# Patient Record
Sex: FEMALE | Race: WHITE | Employment: OTHER | URBAN - METROPOLITAN AREA
[De-identification: names, ages, dates, MRNs, and addresses within clinical notes are randomized per-mention and may not be internally consistent; named-entity substitution may affect disease eponyms.]

---

## 2017-09-30 ENCOUNTER — HOSPITAL ENCOUNTER (EMERGENCY)
Age: 82
Discharge: HOME | End: 2017-09-30
Attending: EMERGENCY MEDICINE | Admitting: EMERGENCY MEDICINE
Payer: MEDICARE

## 2017-09-30 ENCOUNTER — APPOINTMENT (OUTPATIENT)
Dept: GENERAL RADIOLOGY | Age: 82
End: 2017-09-30
Attending: PHYSICIAN ASSISTANT
Payer: MEDICARE

## 2017-09-30 ENCOUNTER — APPOINTMENT (OUTPATIENT)
Dept: CT IMAGING | Age: 82
End: 2017-09-30
Attending: PHYSICIAN ASSISTANT
Payer: MEDICARE

## 2017-09-30 VITALS
SYSTOLIC BLOOD PRESSURE: 172 MMHG | TEMPERATURE: 98 F | RESPIRATION RATE: 22 BRPM | OXYGEN SATURATION: 97 % | HEART RATE: 84 BPM | DIASTOLIC BLOOD PRESSURE: 72 MMHG
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
## 2017-09-30 DIAGNOSIS — N39.0 URINARY TRACT INFECTION WITHOUT HEMATURIA, SITE UNSPECIFIED: ICD-10-CM

## 2017-09-30 DIAGNOSIS — S09.90XA HEAD INJURY, INITIAL ENCOUNTER: ICD-10-CM

## 2017-09-30 DIAGNOSIS — S40.011A CONTUSION OF RIGHT SHOULDER, INITIAL ENCOUNTER: ICD-10-CM

## 2017-09-30 DIAGNOSIS — S32.10XA CLOSED FRACTURE OF SACRUM, UNSPECIFIED PORTION OF SACRUM, INITIAL ENCOUNTER (HCC): ICD-10-CM

## 2017-09-30 LAB
ALBUMIN SERPL-MCNC: 3.3 G/DL (ref 3.5–5)
ALBUMIN/GLOB SERPL: 1 {RATIO} (ref 1.1–2.2)
ALP SERPL-CCNC: 49 U/L (ref 45–117)
ALT SERPL-CCNC: 15 U/L (ref 12–78)
ANION GAP SERPL CALC-SCNC: 9 MMOL/L (ref 5–15)
APPEARANCE UR: ABNORMAL
AST SERPL-CCNC: 23 U/L (ref 15–37)
BACTERIA URNS QL MICRO: ABNORMAL /HPF
BASOPHILS # BLD: 0 K/UL (ref 0–0.1)
BASOPHILS NFR BLD: 0 % (ref 0–1)
BILIRUB SERPL-MCNC: 0.7 MG/DL (ref 0.2–1)
BILIRUB UR QL: NEGATIVE
BUN SERPL-MCNC: 15 MG/DL (ref 6–20)
BUN/CREAT SERPL: 14 (ref 12–20)
CALCIUM SERPL-MCNC: 8.7 MG/DL (ref 8.5–10.1)
CHLORIDE SERPL-SCNC: 100 MMOL/L (ref 97–108)
CO2 SERPL-SCNC: 29 MMOL/L (ref 21–32)
COLOR UR: ABNORMAL
CREAT SERPL-MCNC: 1.04 MG/DL (ref 0.55–1.02)
EOSINOPHIL # BLD: 0.1 K/UL (ref 0–0.4)
EOSINOPHIL NFR BLD: 1 % (ref 0–7)
EPITH CASTS URNS QL MICRO: ABNORMAL /LPF
ERYTHROCYTE [DISTWIDTH] IN BLOOD BY AUTOMATED COUNT: 14.2 % (ref 11.5–14.5)
GLOBULIN SER CALC-MCNC: 3.3 G/DL (ref 2–4)
GLUCOSE SERPL-MCNC: 127 MG/DL (ref 65–100)
GLUCOSE UR STRIP.AUTO-MCNC: NEGATIVE MG/DL
HCT VFR BLD AUTO: 34.9 % (ref 35–47)
HGB BLD-MCNC: 11.7 G/DL (ref 11.5–16)
HGB UR QL STRIP: ABNORMAL
HYALINE CASTS URNS QL MICRO: ABNORMAL /LPF (ref 0–5)
KETONES UR QL STRIP.AUTO: 15 MG/DL
LEUKOCYTE ESTERASE UR QL STRIP.AUTO: ABNORMAL
LYMPHOCYTES # BLD: 1 K/UL (ref 0.8–3.5)
LYMPHOCYTES NFR BLD: 8 % (ref 12–49)
MAGNESIUM SERPL-MCNC: 1.8 MG/DL (ref 1.6–2.4)
MCH RBC QN AUTO: 31.1 PG (ref 26–34)
MCHC RBC AUTO-ENTMCNC: 33.5 G/DL (ref 30–36.5)
MCV RBC AUTO: 92.8 FL (ref 80–99)
MONOCYTES # BLD: 1.1 K/UL (ref 0–1)
MONOCYTES NFR BLD: 8 % (ref 5–13)
NEUTS SEG # BLD: 11.4 K/UL (ref 1.8–8)
NEUTS SEG NFR BLD: 83 % (ref 32–75)
NITRITE UR QL STRIP.AUTO: NEGATIVE
PH UR STRIP: 7.5 [PH] (ref 5–8)
PLATELET # BLD AUTO: 232 K/UL (ref 150–400)
POTASSIUM SERPL-SCNC: 3.7 MMOL/L (ref 3.5–5.1)
PROT SERPL-MCNC: 6.6 G/DL (ref 6.4–8.2)
PROT UR STRIP-MCNC: 30 MG/DL
RBC # BLD AUTO: 3.76 M/UL (ref 3.8–5.2)
RBC #/AREA URNS HPF: ABNORMAL /HPF (ref 0–5)
SODIUM SERPL-SCNC: 138 MMOL/L (ref 136–145)
SP GR UR REFRACTOMETRY: 1.01 (ref 1–1.03)
TROPONIN I SERPL-MCNC: <0.04 NG/ML
UROBILINOGEN UR QL STRIP.AUTO: 1 EU/DL (ref 0.2–1)
WBC # BLD AUTO: 13.7 K/UL (ref 3.6–11)
WBC URNS QL MICRO: >100 /HPF (ref 0–4)

## 2017-09-30 PROCEDURE — 74011250637 HC RX REV CODE- 250/637: Performed by: PHYSICIAN ASSISTANT

## 2017-09-30 PROCEDURE — 81001 URINALYSIS AUTO W/SCOPE: CPT | Performed by: PHYSICIAN ASSISTANT

## 2017-09-30 PROCEDURE — 99285 EMERGENCY DEPT VISIT HI MDM: CPT

## 2017-09-30 PROCEDURE — 70450 CT HEAD/BRAIN W/O DYE: CPT

## 2017-09-30 PROCEDURE — 84484 ASSAY OF TROPONIN QUANT: CPT | Performed by: PHYSICIAN ASSISTANT

## 2017-09-30 PROCEDURE — 94762 N-INVAS EAR/PLS OXIMTRY CONT: CPT

## 2017-09-30 PROCEDURE — 96375 TX/PRO/DX INJ NEW DRUG ADDON: CPT

## 2017-09-30 PROCEDURE — 72192 CT PELVIS W/O DYE: CPT

## 2017-09-30 PROCEDURE — 36415 COLL VENOUS BLD VENIPUNCTURE: CPT | Performed by: PHYSICIAN ASSISTANT

## 2017-09-30 PROCEDURE — 85025 COMPLETE CBC W/AUTO DIFF WBC: CPT | Performed by: PHYSICIAN ASSISTANT

## 2017-09-30 PROCEDURE — 80053 COMPREHEN METABOLIC PANEL: CPT | Performed by: PHYSICIAN ASSISTANT

## 2017-09-30 PROCEDURE — 72125 CT NECK SPINE W/O DYE: CPT

## 2017-09-30 PROCEDURE — 93005 ELECTROCARDIOGRAM TRACING: CPT

## 2017-09-30 PROCEDURE — 73030 X-RAY EXAM OF SHOULDER: CPT

## 2017-09-30 PROCEDURE — 96361 HYDRATE IV INFUSION ADD-ON: CPT

## 2017-09-30 PROCEDURE — 72170 X-RAY EXAM OF PELVIS: CPT

## 2017-09-30 PROCEDURE — 73552 X-RAY EXAM OF FEMUR 2/>: CPT

## 2017-09-30 PROCEDURE — 87086 URINE CULTURE/COLONY COUNT: CPT | Performed by: PHYSICIAN ASSISTANT

## 2017-09-30 PROCEDURE — 74011250636 HC RX REV CODE- 250/636: Performed by: PHYSICIAN ASSISTANT

## 2017-09-30 PROCEDURE — 83735 ASSAY OF MAGNESIUM: CPT | Performed by: PHYSICIAN ASSISTANT

## 2017-09-30 PROCEDURE — 96374 THER/PROPH/DIAG INJ IV PUSH: CPT

## 2017-09-30 RX ORDER — MORPHINE SULFATE 2 MG/ML
2 INJECTION, SOLUTION INTRAMUSCULAR; INTRAVENOUS
Status: DISCONTINUED | OUTPATIENT
Start: 2017-09-30 | End: 2017-09-30 | Stop reason: HOSPADM

## 2017-09-30 RX ORDER — ACETAMINOPHEN 325 MG/1
650 TABLET ORAL
Qty: 20 TAB | Refills: 0 | Status: SHIPPED | OUTPATIENT
Start: 2017-09-30

## 2017-09-30 RX ORDER — ONDANSETRON 2 MG/ML
4 INJECTION INTRAMUSCULAR; INTRAVENOUS
Status: DISCONTINUED | OUTPATIENT
Start: 2017-09-30 | End: 2017-09-30 | Stop reason: HOSPADM

## 2017-09-30 RX ORDER — LEVOFLOXACIN 500 MG/1
500 TABLET, FILM COATED ORAL
Status: COMPLETED | OUTPATIENT
Start: 2017-09-30 | End: 2017-09-30

## 2017-09-30 RX ORDER — ACETAMINOPHEN 325 MG/1
650 TABLET ORAL ONCE
Status: COMPLETED | OUTPATIENT
Start: 2017-09-30 | End: 2017-09-30

## 2017-09-30 RX ORDER — LEVOFLOXACIN 500 MG/1
500 TABLET, FILM COATED ORAL DAILY
Qty: 7 TAB | Refills: 0 | Status: SHIPPED | OUTPATIENT
Start: 2017-09-30

## 2017-09-30 RX ADMIN — LEVOFLOXACIN 500 MG: 500 TABLET, FILM COATED ORAL at 18:24

## 2017-09-30 RX ADMIN — SODIUM CHLORIDE 500 ML: 900 INJECTION, SOLUTION INTRAVENOUS at 13:56

## 2017-09-30 RX ADMIN — ACETAMINOPHEN 650 MG: 325 TABLET ORAL at 18:24

## 2017-09-30 NOTE — DISCHARGE INSTRUCTIONS
Shoulder Pain: Care Instructions  Your Care Instructions    You can hurt your shoulder by using it too much during an activity, such as fishing or baseball. It can also happen as part of the everyday wear and tear of getting older. Shoulder injuries can be slow to heal, but your shoulder should get better with time. Your doctor may recommend a sling to rest your shoulder. If you have injured your shoulder, you may need testing and treatment. Follow-up care is a key part of your treatment and safety. Be sure to make and go to all appointments, and call your doctor if you are having problems. It's also a good idea to know your test results and keep a list of the medicines you take. How can you care for yourself at home? · Take pain medicines exactly as directed. ¨ If the doctor gave you a prescription medicine for pain, take it as prescribed. ¨ If you are not taking a prescription pain medicine, ask your doctor if you can take an over-the-counter medicine. ¨ Do not take two or more pain medicines at the same time unless the doctor told you to. Many pain medicines contain acetaminophen, which is Tylenol. Too much acetaminophen (Tylenol) can be harmful. · If your doctor recommends that you wear a sling, use it as directed. Do not take it off before your doctor tells you to. · Put ice or a cold pack on the sore area for 10 to 20 minutes at a time. Put a thin cloth between the ice and your skin. · If there is no swelling, you can put moist heat, a heating pad, or a warm cloth on your shoulder. Some doctors suggest alternating between hot and cold. · Rest your shoulder for a few days. If your doctor recommends it, you can then begin gentle exercise of the shoulder, but do not lift anything heavy. When should you call for help? Call 911 anytime you think you may need emergency care. For example, call if:  · You have chest pain or pressure. This may occur with:  ¨ Sweating. ¨ Shortness of breath.   ¨ Nausea or vomiting. ¨ Pain that spreads from the chest to the neck, jaw, or one or both shoulders or arms. ¨ Dizziness or lightheadedness. ¨ A fast or uneven pulse. After calling 911, chew 1 adult-strength aspirin. Wait for an ambulance. Do not try to drive yourself. · Your arm or hand is cool or pale or changes color. Call your doctor now or seek immediate medical care if:  · You have signs of infection, such as:  ¨ Increased pain, swelling, warmth, or redness in your shoulder. ¨ Red streaks leading from a place on your shoulder. ¨ Pus draining from an area of your shoulder. ¨ Swollen lymph nodes in your neck, armpits, or groin. ¨ A fever. Watch closely for changes in your health, and be sure to contact your doctor if:  · You cannot use your shoulder. · Your shoulder does not get better as expected. Where can you learn more? Go to http://sonyActianceyarely.info/. Enter W719 in the search box to learn more about \"Shoulder Pain: Care Instructions. \"  Current as of: March 21, 2017  Content Version: 11.3  © 4298-0013 [employer]. Care instructions adapted under license by [employer] (which disclaims liability or warranty for this information). If you have questions about a medical condition or this instruction, always ask your healthcare professional. [address] any warranty or liability for your use of this information. Learning About a Closed Head Injury  What is a closed head injury? A closed head injury happens when your head gets hit hard. The strong force of the blow causes your brain to shake in your skull. This movement can cause the brain to bruise, swell, or tear. Sometimes nerves or blood vessels also get damaged. This can cause bleeding in or around the brain. A concussion is a type of closed head injury. What are the symptoms? If you have a mild concussion, you may have a mild headache or feel \"not quite right. \" These symptoms are common. They usually go away over a few days to 4 weeks. But sometimes after a concussion, you feel like you can't function as well as before the injury. And you have new symptoms. This is called postconcussive syndrome. You may:  · Find it harder to solve problems, think, concentrate, or remember. · Have headaches. · Have changes in your sleep patterns, such as not being able to sleep or sleeping all the time. · Have changes in your personality. · Not be interested in your usual activities. · Feel angry or anxious without a clear reason. · Lose your sense of taste or smell. · Be dizzy, lightheaded, or unsteady. It may be hard to stand or walk. How is a closed head injury treated? Any person who may have a concussion needs to see a doctor. Some people have to stay in the hospital to be watched. Others can go home safely. If you go home, follow your doctor's instructions. He or she will tell you if you need someone to watch you closely for the next 24 hours or longer. Rest is the best treatment. Get plenty of sleep at night. And try to rest during the day. · Avoid activities that are physically or mentally demanding. These include housework, exercise, and schoolwork. And don't play video games, send text messages, or use the computer. You may need to change your school or work schedule to be able to avoid these activities. · Ask your doctor when it's okay to drive, ride a bike, or operate machinery. · Take an over-the-counter pain medicine, such as acetaminophen (Tylenol), ibuprofen (Advil, Motrin), or naproxen (Aleve). Be safe with medicines. Read and follow all instructions on the label. · Check with your doctor before you use any other medicines for pain. · Do not drink alcohol or use illegal drugs. They can slow recovery. They can also increase your risk of getting a second head injury. Follow-up care is a key part of your treatment and safety.  Be sure to make and go to all appointments, and call your doctor if you are having problems. It's also a good idea to know your test results and keep a list of the medicines you take. Where can you learn more? Go to http://sony-yarely.info/. Enter E235 in the search box to learn more about \"Learning About a Closed Head Injury. \"  Current as of: October 14, 2016  Content Version: 11.3  © [id number] [employer]. Care instructions adapted under license by [employer] (which disclaims liability or warranty for this information). If you have questions about a medical condition or this instruction, always ask your healthcare professional. [place] [address] any warranty or liability for your use of this information. Preventing Falls: Care Instructions  Your Care Instructions  Getting around your home safely can be a challenge if you have injuries or health problems that make it easy for you to fall. Loose rugs and furniture in walkways are among the dangers for many older people who have problems walking or who have poor eyesight. People who have conditions such as arthritis, osteoporosis, or dementia also have to be careful not to fall. You can make your home safer with a few simple measures. Follow-up care is a key part of your treatment and safety. Be sure to make and go to all appointments, and call your doctor if you are having problems. It's also a good idea to know your test results and keep a list of the medicines you take. How can you care for yourself at home? Taking care of yourself  · You may get dizzy if you do not drink enough water. To prevent dehydration, drink plenty of fluids, enough so that your urine is light yellow or clear like water. Choose water and other caffeine-free clear liquids. If you have kidney, heart, or liver disease and have to limit fluids, talk with your doctor before you increase the amount of fluids you drink.   · Exercise regularly to improve your strength, muscle tone, and balance. Walk if you can. Swimming may be a good choice if you cannot walk easily. · Have your vision and hearing checked each year or any time you notice a change. If you have trouble seeing and hearing, you might not be able to avoid objects and could lose your balance. · Know the side effects of the medicines you take. Ask your doctor or pharmacist whether the medicines you take can affect your balance. Sleeping pills or sedatives can affect your balance. · Limit the amount of alcohol you drink. Alcohol can impair your balance and other senses. · Ask your doctor whether calluses or corns on your feet need to be removed. If you wear loose-fitting shoes because of calluses or corns, you can lose your balance and fall. · Talk to your doctor if you have numbness in your feet. Preventing falls at home  · Remove raised doorway thresholds, throw rugs, and clutter. Repair loose carpet or raised areas in the floor. · Move furniture and electrical cords to keep them out of walking paths. · Use nonskid floor wax, and wipe up spills right away, especially on ceramic tile floors. · If you use a walker or cane, put rubber tips on it. If you use crutches, clean the bottoms of them regularly with an abrasive pad, such as steel wool. · Keep your house well lit, especially [place], and outside walkways. Use night-lights in areas such as hallways and bathrooms. Add extra light switches or use remote switches (such as switches that go on or off when you clap your hands) to make it easier to turn lights on if you have to get up during the night. · Install sturdy handrails on stairways. · Move items in your cabinets so that the things you use a lot are on the lower shelves (about waist level). · Keep a cordless phone and a flashlight with new batteries by your bed. If possible, put a phone in each of the main rooms of your house, or carry a cell phone in case you fall and cannot reach a phone. Or, you can wear a device around your neck or wrist. You push a button that sends a signal for help. · Wear low-heeled shoes that fit well and give your feet good support. Use footwear with nonskid soles. Check the heels and soles of your shoes for wear. Repair or replace worn heels or soles. · Do not wear socks without shoes on wood floors. · Walk on the grass when the sidewalks are slippery. If you live in an area that gets snow and ice in the winter, sprinkle salt on slippery steps and sidewalks. Preventing falls in the bath  · Install grab bars and nonskid mats inside and outside your shower or tub and near the toilet and sinks. · Use shower chairs and bath benches. · Use a hand-held shower head that will allow you to sit while showering. · Get into a tub or shower by putting the weaker leg in first. Get out of a tub or shower with your strong side first.  · Repair loose toilet seats and consider installing a raised toilet seat to make getting on and off the toilet easier. · Keep your bathroom door unlocked while you are in the shower. Where can you learn more? Go to http://sony-yarely.info/. Enter [id number] [id number] in the search box to learn more about \"Preventing Falls: Care Instructions. \"  Current as of: August 4, 2016  Content Version: 11.3  © 2636-4549 [employer]. Care instructions adapted under license by [employer] (which disclaims liability or warranty for this information). If you have questions about a medical condition or this instruction, always ask your healthcare professional. [place] [address] any warranty or liability for your use of this information. Sacral Fracture: Care Instructions  Your Care Instructions    A sacral fracture occurs when a bone called the sacrum breaks. The sacrum is a large triangular bone at the bottom of the spine. It fits like a wedge between the two hipbones.  The sacrum is made up of the sacral vertebrae, which are fused together. Sometimes the coccyx, or tailbone, is fractured along with the sacrum. The tailbone is the small bone at the bottom of the spine. It is attached to the sacral bone above it. In some cases, an injury to the sacrum can affect the nerves that control the bladder, bowel, or legs. Home treatment may be all that is needed for some sacral fractures. If a fracture is severe or affects nerves, you may need surgery. You heal best when you take good care of yourself. Eat a variety of healthy foods, and don't smoke. Follow-up care is a key part of your treatment and safety. Be sure to make and go to all appointments, and call your doctor if you are having problems. It's also a good idea to know your test results and keep a list of the medicines you take. How can you care for yourself at home? · Follow your doctor's instructions for bed rest and activity. · Take pain medicines exactly as directed. ¨ If the doctor gave you a prescription medicine for pain, take it as prescribed. ¨ If you are not taking a prescription pain medicine, ask your doctor if you can take an over-the-counter medicine. · Put ice or a cold pack on the painful area for 10 to 20 minutes at a time. Try to do this every 1 to 2 hours for the next 3 days (when you are awake). Put a thin cloth between the ice and your skin or brace. · Do not sit on hard, unpadded surfaces. Sit on a doughnut-shaped pillow to take pressure off the tailbone area. · Put only as much weight on each leg as your doctor tells you to. He or she may advise you to use crutches, a walker, or a cane to help you walk. · Avoid constipation, because straining to have a bowel movement will increase your pain. ¨ Include fruits, vegetables, beans, and whole grains in your diet each day. These foods are high in fiber. ¨ Drink plenty of fluids, enough so that your urine is light yellow or clear like water.  If you have kidney, heart, or liver disease and have to limit fluids, talk with your doctor before you increase your fluid intake. When should you call for help? Call 911 anytime you think you may need emergency care. For example, call if:  · You are unable to move a leg at all. Call your doctor now or seek immediate medical care if:  · You have new or worse symptoms in your legs or buttocks. Symptoms may include:  ¨ Numbness or tingling. ¨ Weakness. ¨ Pain. · You lose bladder or bowel control. Watch closely for changes in your health, and be sure to contact your doctor if:  · You are not getting better as expected. Where can you learn more? Go to http://sonyQinging Weekly Flower Deliveryyarely.info/. Enter 322 [id number] in the search box to learn more about \"Sacral Fracture: Care Instructions. \"  Current as of: March 21, 2017  Content Version: 11.3  © 3403-1855 [employer]. Care instructions adapted under license by [employer] (which disclaims liability or warranty for this information). If you have questions about a medical condition or this instruction, always ask your healthcare professional. [place] [address] any warranty or liability for your use of this information. Urinary Tract Infection in Women: Care Instructions  Your Care Instructions    A urinary tract infection, or UTI, is a general term for an infection anywhere between the kidneys and the urethra (where urine comes out). Most UTIs are bladder infections. They often cause pain or burning when you urinate. UTIs are caused by bacteria and can be cured with antibiotics. Be sure to complete your treatment so that the infection goes away. Follow-up care is a key part of your treatment and safety. Be sure to make and go to all appointments, and call your doctor if you are having problems. It's also a good idea to know your test results and keep a list of the medicines you take. How can you care for yourself at home? · Take your antibiotics as directed.  Do not stop taking them just because you feel better. You need to take the full course of antibiotics. · Drink extra water and other fluids for the next day or two. This may help wash out the bacteria that are causing the infection. (If you have kidney, heart, or liver disease and have to limit fluids, talk with your doctor before you increase your fluid intake.)  · Avoid drinks that are carbonated or have caffeine. They can irritate the bladder. · Urinate often. Try to empty your bladder each time. · To relieve pain, take a hot bath or lay a heating pad set on low over your lower belly or genital area. Never go to sleep with a heating pad in place. To prevent UTIs  · Drink plenty of water each day. This helps you urinate often, which clears bacteria from your system. (If you have kidney, heart, or liver disease and have to limit fluids, talk with your doctor before you increase your fluid intake.)  · Urinate when you need to. · Urinate right after you have sex. · Change sanitary pads often. · Avoid douches, bubble baths, feminine hygiene sprays, and other feminine hygiene products that have deodorants. · After going to the bathroom, wipe from front to back. When should you call for help? Call your doctor now or seek immediate medical care if:  · Symptoms such as fever, chills, nausea, or vomiting get worse or appear for the first time. · You have new pain in your back just below your rib cage. This is called flank pain. · There is new blood or pus in your urine. · You have any problems with your antibiotic medicine. Watch closely for changes in your health, and be sure to contact your doctor if:  · You are not getting better after taking an antibiotic for 2 days. · Your symptoms go away but then come back. Where can you learn more? Go to http://sony-yarely.info/. Enter I252 in the search box to learn more about \"Urinary Tract Infection in Women: Care Instructions. \"  Current as of: November 28, 2016  Content Version: 11.3  © 5194-4239 [employer], [employer]. Care instructions adapted under license by [employer] (which disclaims liability or warranty for this information). If you have questions about a medical condition or this instruction, always ask your healthcare professional. [address] any warranty or liability for your use of this information. We hope that we have addressed all of your medical concerns. The examination and treatment you received in the Emergency Department were for an emergent problem and were not intended as complete care. It is important that you follow up with your healthcare provider(s) for ongoing care. If your symptoms worsen or do not improve as expected, and you are unable to reach your usual health care provider(s), you should return to the Emergency Department. Today's healthcare is undergoing tremendous change, and patient satisfaction surveys are one of the many tools to assess the quality of medical care. You may receive a survey from the [employer] regarding your experience in the Emergency Department. I hope that your experience has been completely positive, particularly the medical care that I provided. As such, please participate in the survey; anything less than excellent does not meet my expectations or intentions. [address] and [address] [place] [address] participate in nationally recognized quality of care measures. If your blood pressure is greater than 120/80, as reported below, we urge that you seek medical care to address the potential of high blood pressure, commonly known as hypertension. Hypertension can be hereditary or can be caused by certain medical conditions, pain, stress, or \"white coat syndrome. \"       Please make an appointment with your health care provider(s) for follow up of your Emergency Department visit.        VITALS: Patient Vitals for the past 8 hrs:   Temp Pulse Resp BP SpO2   09/30/17 1630 - 84 22 172/72 97 %   09/30/17 1600 - 90 14 166/82 98 %   09/30/17 1530 - 85 19 171/65 100 %   09/30/17 1430 - 84 14 170/72 97 %   09/30/17 1355 - 85 10 164/73 97 %   09/30/17 1245 - - - 173/84 98 %   09/30/17 1240 98 °F (36.7 °C) 98 16 170/73 94 %          Thank you for allowing us to provide you with medical care today. We realize that you have many choices for your emergency care needs. Please choose us in the future for any continued health care needs. Dilshad Mccormick, [address] [place] [address].   Office: [phone]            Recent Results (from the past 24 hour(s))   CBC WITH AUTOMATED DIFF    Collection Time: 09/30/17  1:37 PM   Result Value Ref Range    WBC 13.7 (H) 3.6 - 11.0 K/uL    RBC 3.76 (L) 3.80 - 5.20 M/uL    HGB 11.7 11.5 - 16.0 g/dL    HCT 34.9 (L) 35.0 - 47.0 %    MCV 92.8 80.0 - 99.0 FL    MCH 31.1 26.0 - 34.0 PG    MCHC 33.5 30.0 - 36.5 g/dL    RDW 14.2 11.5 - 14.5 %    PLATELET 765 357 - 286 K/uL    NEUTROPHILS 83 (H) 32 - 75 %    LYMPHOCYTES 8 (L) 12 - 49 %    MONOCYTES 8 5 - 13 %    EOSINOPHILS 1 0 - 7 %    BASOPHILS 0 0 - 1 %    ABS. NEUTROPHILS 11.4 (H) 1.8 - 8.0 K/UL    ABS. LYMPHOCYTES 1.0 0.8 - 3.5 K/UL    ABS. MONOCYTES 1.1 (H) 0.0 - 1.0 K/UL    ABS. EOSINOPHILS 0.1 0.0 - 0.4 K/UL    ABS.  BASOPHILS 0.0 0.0 - 0.1 K/UL   METABOLIC PANEL, COMPREHENSIVE    Collection Time: 09/30/17  1:37 PM   Result Value Ref Range    Sodium 138 136 - 145 mmol/L    Potassium 3.7 3.5 - 5.1 mmol/L    Chloride 100 97 - 108 mmol/L    CO2 29 21 - 32 mmol/L    Anion gap 9 5 - 15 mmol/L    Glucose 127 (H) 65 - 100 mg/dL    BUN 15 6 - 20 MG/DL    Creatinine 1.04 (H) 0.55 - 1.02 MG/DL    BUN/Creatinine ratio 14 12 - 20      GFR est AA 59 (L) >60 ml/min/1.73m2    GFR est non-AA 49 (L) >60 ml/min/1.73m2    Calcium 8.7 8.5 - 10.1 MG/DL    Bilirubin, total 0.7 0.2 - 1.0 MG/DL    ALT (SGPT) 15 12 - 78 U/L    AST (SGOT) 23 15 - 37 U/L    Alk. phosphatase 49 45 - 117 U/L    Protein, total 6.6 6.4 - 8.2 g/dL    Albumin 3.3 (L) 3.5 - 5.0 g/dL    Globulin 3.3 2.0 - 4.0 g/dL    A-G Ratio 1.0 (L) 1.1 - 2.2     MAGNESIUM    Collection Time: 09/30/17  1:37 PM   Result Value Ref Range    Magnesium 1.8 1.6 - 2.4 mg/dL   TROPONIN I    Collection Time: 09/30/17  1:37 PM   Result Value Ref Range    Troponin-I, Qt. <0.04 <0.05 ng/mL   EKG, 12 LEAD, INITIAL    Collection Time: 09/30/17  1:44 PM   Result Value Ref Range    Ventricular Rate 88 BPM    Atrial Rate 88 BPM    P-R Interval 160 ms    QRS Duration 110 ms    Q-T Interval 382 ms    QTC Calculation (Bezet) 462 ms    Calculated P Axis 51 degrees    Calculated R Axis -42 degrees    Calculated T Axis -11 degrees    Diagnosis       Normal sinus rhythm  Left axis deviation  Incomplete right bundle branch block  Moderate voltage criteria for LVH, may be normal variant  T wave abnormality, consider anterior ischemia  Abnormal ECG  When compared with ECG of 19-NOV-2016 18:29,  No significant change was found     URINALYSIS W/ RFLX MICROSCOPIC    Collection Time: 09/30/17  3:10 PM   Result Value Ref Range    Color YELLOW/STRAW      Appearance CLOUDY (A) CLEAR      Specific gravity 1.013 1.003 - 1.030      pH (UA) 7.5 5.0 - 8.0      Protein 30 (A) NEG mg/dL    Glucose NEGATIVE  NEG mg/dL    Ketone 15 (A) NEG mg/dL    Bilirubin NEGATIVE  NEG      Blood TRACE (A) NEG      Urobilinogen 1.0 0.2 - 1.0 EU/dL    Nitrites NEGATIVE  NEG      Leukocyte Esterase LARGE (A) NEG      WBC >100 (H) 0 - 4 /hpf    RBC 0-5 0 - 5 /hpf    Epithelial cells FEW FEW /lpf    Bacteria 2+ (A) NEG /hpf    Hyaline cast 2-5 0 - 5 /lpf       Xr Pelv Ap Only    Result Date: 9/30/2017  EXAM:  XR PELV AP ONLY, XR FEMUR LT 2 V, XR FEMUR RT 2 VS INDICATION: Left hip pain and pelvic after ground-level fall today. 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 COMPARISON: None.  FINDINGS: An AP view of the pelvis demonstrates no displaced fracture. There is a lucency across the left femoral neck. 2 views of the right femur demonstrate no fracture or other osseous, articular or soft tissue abnormality. A right total hip replacement is in place without evidence for loosening. Chondrocalcinosis of the knee is noted. 2  views of the left femur demonstrate no fracture or other osseous, articular or soft tissue abnormality. Chondrocalcinosis of the knee and hip is noted. The finding on the pelvic radiograph is not confirmed on targeted images of the left hip. IMPRESSION: Osteopenic pelvis and hips. No fracture seen. Xr Shoulder Rt Ap/lat Min 2 V    Result Date: 9/30/2017  EXAM:  XR SHOULDER RT AP/LAT MIN 2 V INDICATION: Right shoulder pain after ground-level fall today. Visible bruise. COMPARISON: None. FINDINGS: Three views of the right shoulder demonstrate no fracture, dislocation or other acute abnormality. There is diffuse osteopenia. Chondrocalcinosis of the acromioclavicular joint is noted. IMPRESSION: Osteopenic right shoulder     Xr Femur Lt 2 V    Result Date: 9/30/2017  EXAM:  XR PELV AP ONLY, XR FEMUR LT 2 V, XR FEMUR RT 2 VS INDICATION: Left hip pain and pelvic after ground-level fall today. 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 COMPARISON: None. FINDINGS: An AP view of the pelvis demonstrates no displaced fracture. There is a lucency across the left femoral neck. 2 views of the right femur demonstrate no fracture or other osseous, articular or soft tissue abnormality. A right total hip replacement is in place without evidence for loosening. Chondrocalcinosis of the knee is noted. 2  views of the left femur demonstrate no fracture or other osseous, articular or soft tissue abnormality. Chondrocalcinosis of the knee and hip is noted. The finding on the pelvic radiograph is not confirmed on targeted images of the left hip. IMPRESSION: Osteopenic pelvis and hips.  No fracture seen.     Xr Femur Rt 2 Vs    Result Date: 9/30/2017  EXAM:  XR PELV AP ONLY, XR FEMUR LT 2 V, XR FEMUR RT 2 VS INDICATION: Left hip pain and pelvic after ground-level fall today. 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 COMPARISON: None. FINDINGS: An AP view of the pelvis demonstrates no displaced fracture. There is a lucency across the left femoral neck. 2 views of the right femur demonstrate no fracture or other osseous, articular or soft tissue abnormality. A right total hip replacement is in place without evidence for loosening. Chondrocalcinosis of the knee is noted. 2  views of the left femur demonstrate no fracture or other osseous, articular or soft tissue abnormality. Chondrocalcinosis of the knee and hip is noted. The finding on the pelvic radiograph is not confirmed on targeted images of the left hip. IMPRESSION: Osteopenic pelvis and hips. No fracture seen. Ct Head Wo Cont    Result Date: 9/30/2017  EXAM:  CT HEAD WO CONT INDICATION:   Neck pain after unwitnessed ground-level fall. COMPARISON: None. CONTRAST:  None. TECHNIQUE: Unenhanced CT of the head was performed using 5 mm images. Brain and bone windows were generated. CT dose reduction was achieved through use of a standardized protocol tailored for this examination and automatic exposure control for dose modulation. FINDINGS: The ventricles and sulci are normal in size, shape and configuration and midline. There is moderate periventricular white matter hypodensity. . There is no intracranial hemorrhage, extra-axial collection, mass, mass effect or midline shift. The basilar cisterns are open. No acute infarct is identified. The bone windows demonstrate no abnormalities. The visualized portions of the paranasal sinuses and mastoid air cells are clear. IMPRESSION: No acute intracranial process identified. Specifically, no intracranial bleed. Underlying white matter disease.      Ct Spine Cerv Wo Cont    Result Date: 9/30/2017  EXAM:  CT CERVICAL SPINE WITHOUT CONTRAST INDICATION: Neck pain after an unwitnessed ground-level fall at the nursing home. COMPARISON: 1/19/2015 CONTRAST:  None. TECHNIQUE: Multislice helical CT of the cervical spine was performed without intravenous contrast administration. Sagittal and coronal reconstructions were generated. CT dose reduction was achieved through use of a standardized protocol tailored for this examination and automatic exposure control for dose modulation. FINDINGS: There is unchanged 2.7 mm anterolisthesis of C6 relative to C7, 1.7 mm anterolisthesis of C4 relative to C5, and 2 mm retrolisthesis of C2 relative to C3. There is no acute fracture. The odontoid process is intact. The craniocervical junction is within normal limits. The prevertebral soft tissues are within normal limits. C1-2, there is chondrocalcinosis of the C1-2 articulation with an erosive change at the base of the dens but no fracture. Severe end-stage left C1-2 arthropathy is noted on coronal image 17. C2-C3:  There is no spinal canal or neural foraminal stenosis. No central canal measures 12 mm anterior to posterior. C3-C4:  There is a disc osteophyte complex resulting in mild bilateral neural foraminal stenosis. The right C3-4 facet is fused. There is no central stenosis. C4-C5:  There is a disc osteophyte complex which results in mild bilateral neural foraminal stenosis (image 39) C5-C6:  There is a disc osteophyte complex, eccentric to the right. This results in moderate right-sided and no significant left neural foraminal stenosis. (Axial image 44). C6-C7:  There is a disc osteophyte complex eccentric which results in mild bilateral neural foraminal stenosis. The central canal measures 13.8 mm anterior-posterior. (Image 46). C7-T1:  There is a disc osteophyte complex eccentric to the left. This results in moderate severe left neural foraminal stenosis. The central canal measures 13.8 mm anterior-posterior. (Image 50). A stable erosive disc process is noted. 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 Severe left lateral mass arthropathy Stable subluxations as described Stable erosive disc process resulting in a deformity of the superior endplate of the T1 vertebral body. Moderate right C5-6 neural foraminal stenosis. Ct Pelv Wo Cont    Result Date: 9/30/2017  EXAM:  CT PELV WO CONT INDICATION:  fall, hip pain COMPARISON: 2/23/2017. CONTRAST:  None. TECHNIQUE: Multislice helical CT was performed from the iliac crest to the symphysis pubis without intravenous contrast administration Oral contrast was not administered. Coronal and sagittal reformations were generated. CT dose reduction was achieved through use of a standardized protocol tailored for this examination and automatic exposure control for dose modulation. Three-dimensional reconstructions were created the in the laboratory. FINDING: The visualized bowel within the pelvis is normal. The uterus is surgically absent. There is no pelvic mass or adenopathy. There is no pelvic ascites or fluid collection. There is chondrocalcinosis of the symphysis. A right total hip replacement is in place. Bilateral sacroiliac vacuum phenomena are seen. Asymmetric facet arthropathy is noted on the right at L4-5 and L5-S1, resulting in severe right L5-S1 and moderate right L4-5 neural foraminal stenosis. A vertically oriented fracture is noted through the right sacrum (coronal image 80 and axial image 29). This does not appear to disrupt any of the sacral arcuate foramen. There is disc space narrowing with vacuum phenomena at L4-5 and L5-S1. IMPRESSION: Osteopenia. Acute vertically oriented right sacral fracture.  Severe right L5-S1 neural foraminal stenosis

## 2017-09-30 NOTE — ED TRIAGE NOTES
Pt arrives via EMS from [facility] after an unwitnessed fall. C/o left hip pain, right shoulder pain, slight neck pain. Bruise noted on right shoulder. Pt A&Ox4, and ambulates with walker.

## 2017-09-30 NOTE — ED NOTES
Pt assisted to bedside commode to void and assisted back into stretcher, on monitor x 3 with call bell within reach. Pt denies being in pain.  Daughter at bedside

## 2017-09-30 NOTE — ED NOTES
Pt ambulated with walker and nurse assist. Pt's gait slow, and unfamiliar walker caused her some trouble. Pt able to bear weight on legs and hips.

## 2017-09-30 NOTE — ED NOTES
MD reviewed discharge instructions and options with patient; patient verbalized understanding. Pt. Was wheeled to exit without difficulty and in no signs of acute distress. Patient was counseled on medications prescribed at discharge and will follow up as discussed.

## 2017-09-30 NOTE — ED PROVIDER NOTES
HPI Comments: Lacey Snyder is a 80 y.o. female who presents via EMS to the ED with a c/o GLF pta. Pt notes she was walking to the bathroom, but just did not make it. She landed on her right side and hit her head, but denies loc. She denies cp or sob prior to falling. She denies dizziness. Pt notes no one saw her fall and eventually she got up by herself. She notes right shoulder and bilateral hip pain. She denies tx pta. Pt notes she normally walks with a walker. She states she has been feeling well otherwise. She specifically denies any fevers, chills, nausea, vomiting, chest pain, abd pain, urinary sx, shortness of breath, headache, rash, diarrhea, sweating or weight loss. PCP: Estefany Carter DO  PMHx significant for: Past Medical History:  No date: Asthma  No date: CHF (congestive heart failure) (HCC)  No date: Chronic obstructive pulmonary disease (HCC)  No date: Hypertension  No date: Hypothyroidism  No date: Stroke [facility])  PSHx significant for: Past Surgical History:  No date: HX CATARACT REMOVAL Bilateral  No date: HX HIP REPLACEMENT Right  No date: HX HYSTERECTOMY  Social Hx: Tobacco: denies  EtOH: denies  Illicit drug use: denies     There are no further complaints or symptoms at this time. The history is provided by the patient, the EMS personnel and a relative (daughter). Past Medical History:   Diagnosis Date    Asthma     CHF (congestive heart failure) (HCC)     Chronic obstructive pulmonary disease (HCC)     Hypertension     Hypothyroidism     Stroke ([facility] [address]        Past Surgical History:   Procedure Laterality Date    HX CATARACT REMOVAL Bilateral     HX HIP REPLACEMENT Right     HX HYSTERECTOMY           Family History:   Problem Relation Age of Onset    Parkinson's Disease Other        Social History     Social History    Marital status:      Spouse name: N/A    Number of children: N/A    Years of education: N/A     Occupational History    Not on file. Social History Main Topics    Smoking status: Never Smoker    Smokeless tobacco: Not on file    Alcohol use No    Drug use: Not on file    Sexual activity: Not on file     Other Topics Concern    Not on file     Social History Narrative         ALLERGIES: Pcn [penicillins] and Shellfish derived    Review of Systems   Constitutional: Negative for chills and fever. HENT: Negative for congestion, rhinorrhea, sneezing and sore throat. Eyes: Negative for redness and visual disturbance. Respiratory: Negative for shortness of breath. Cardiovascular: Negative for chest pain and leg swelling. Gastrointestinal: Negative for abdominal pain, nausea and vomiting. Genitourinary: Negative for difficulty urinating and frequency. Musculoskeletal: Positive for arthralgias, gait problem and myalgias. Negative for back pain and neck stiffness. Skin: Negative for rash. Neurological: Negative for dizziness, syncope, weakness and headaches. Hematological: Negative for adenopathy. Patient Vitals for the past 12 hrs:   Temp Pulse Resp BP SpO2   09/30/17 1630 - 84 22 172/72 97 %   09/30/17 1600 - 90 14 166/82 98 %   09/30/17 1530 - 85 19 171/65 100 %   09/30/17 1430 - 84 14 170/72 97 %   09/30/17 1355 - 85 10 164/73 97 %   09/30/17 1245 - - - 173/84 98 %   09/30/17 1240 98 °F (36.7 °C) 98 16 170/73 94 %              Physical Exam   Constitutional: She is oriented to person, place, and time. She appears well-developed and well-nourished. No distress. Elderly frail female   HENT:   Head: Normocephalic and atraumatic. Right Ear: External ear normal.   Left Ear: External ear normal.   Eyes: EOM are normal. Pupils are equal, round, and reactive to light. Neck: Neck supple. Cardiovascular: Normal rate, regular rhythm, normal heart sounds and intact distal pulses. Exam reveals no gallop and no friction rub. No murmur heard. Pulmonary/Chest: Effort normal and breath sounds normal. No stridor.  No respiratory distress. She has no wheezes. She has no rales. She exhibits no tenderness. Abdominal: Soft. Bowel sounds are normal. She exhibits no distension and no mass. There is no tenderness. There is no rebound and no guarding. Musculoskeletal: She exhibits edema and tenderness. She exhibits no deformity. Right shoulder with ecchymosis and swelling. + diffusely TTP with palpation and movement. Distal n/v intact. Cap refill brisk. Normothermic. Bilateral hip pain on palpation without swelling, discoloration or lesions. Distal n/v intact. Cap refill brisk. Normothermic. Able to SLR both legs  Back non-tender to midline and throughout no swelling or step off. No discoloration. No deformity or lesions. Neg SLR neg EHL neg VIDA. Distal n/v intact. Cap refill brisk   Neurological: She is alert and oriented to person, place, and time. No cranial nerve deficit. Coordination normal.   Skin: No rash noted. No erythema. No pallor. Psychiatric: She has a normal mood and affect. Her behavior is normal.   Nursing note and vitals reviewed. MDM  Number of Diagnoses or Management Options     Amount and/or Complexity of Data Reviewed  Clinical lab tests: ordered and reviewed  Tests in the radiology section of CPT®: ordered and reviewed  Tests in the medicine section of CPT®: reviewed and ordered  Obtain history from someone other than the patient: yes (Ems, daughter)  Review and summarize past medical records: yes  Independent visualization of images, tracings, or specimens: yes    Patient Progress  Patient progress: stable    ED Course       Procedures  12:50 PM  Discussed pt, sx, hx and current findings with Dr Mindi Rivera. She is in agreement with plan and will see pt  Reji Pedersen. MAGDY Mccormick    ED EKG interpretation: 1:44PM  Rhythm: normal sinus rhythm, incomplete RBBB; and regular .  Rate (approx.): 88; Axis: left axis deviation; P wave: normal; QRS interval: normal ; ST/T wave: non-specific  T wave changes in anterior leads; Other findings: abnormal ekg. This EKG was interpreted by Jerome Marie MD,ED Provider. 2:02 PM   xrays neg will scan pelvis for ramus fracture  Arabella Kaye. MAGDY Mccormick    5:00 PM    Pt with uti. Will tx with levaquin given pcn allergy. Updated pt and family on findings (sacral fx and uti). Will ambulate pt with walker. Pt requesting medication for pain now. Pt previously declined morphine  Arabella Kaye. MAGDY Mccormick      6:30 PM   Pt able to ambulate with walker. Will discharge home  Arabella Kaye. MAGDY Mccormick    LABORATORY TESTS:  Recent Results (from the past 12 hour(s))   CBC WITH AUTOMATED DIFF    Collection Time: 09/30/17  1:37 PM   Result Value Ref Range    WBC 13.7 (H) 3.6 - 11.0 K/uL    RBC 3.76 (L) 3.80 - 5.20 M/uL    HGB 11.7 11.5 - 16.0 g/dL    HCT 34.9 (L) 35.0 - 47.0 %    MCV 92.8 80.0 - 99.0 FL    MCH 31.1 26.0 - 34.0 PG    MCHC 33.5 30.0 - 36.5 g/dL    RDW 14.2 11.5 - 14.5 %    PLATELET 544 951 - 451 K/uL    NEUTROPHILS 83 (H) 32 - 75 %    LYMPHOCYTES 8 (L) 12 - 49 %    MONOCYTES 8 5 - 13 %    EOSINOPHILS 1 0 - 7 %    BASOPHILS 0 0 - 1 %    ABS. NEUTROPHILS 11.4 (H) 1.8 - 8.0 K/UL    ABS. LYMPHOCYTES 1.0 0.8 - 3.5 K/UL    ABS. MONOCYTES 1.1 (H) 0.0 - 1.0 K/UL    ABS. EOSINOPHILS 0.1 0.0 - 0.4 K/UL    ABS. BASOPHILS 0.0 0.0 - 0.1 K/UL   METABOLIC PANEL, COMPREHENSIVE    Collection Time: 09/30/17  1:37 PM   Result Value Ref Range    Sodium 138 136 - 145 mmol/L    Potassium 3.7 3.5 - 5.1 mmol/L    Chloride 100 97 - 108 mmol/L    CO2 29 21 - 32 mmol/L    Anion gap 9 5 - 15 mmol/L    Glucose 127 (H) 65 - 100 mg/dL    BUN 15 6 - 20 MG/DL    Creatinine 1.04 (H) 0.55 - 1.02 MG/DL    BUN/Creatinine ratio 14 12 - 20      GFR est AA 59 (L) >60 ml/min/1.73m2    GFR est non-AA 49 (L) >60 ml/min/1.73m2    Calcium 8.7 8.5 - 10.1 MG/DL    Bilirubin, total 0.7 0.2 - 1.0 MG/DL    ALT (SGPT) 15 12 - 78 U/L    AST (SGOT) 23 15 - 37 U/L    Alk.  phosphatase 49 45 - 117 U/L    Protein, total 6.6 6.4 - 8.2 g/dL Albumin 3.3 (L) 3.5 - 5.0 g/dL    Globulin 3.3 2.0 - 4.0 g/dL    A-G Ratio 1.0 (L) 1.1 - 2.2     MAGNESIUM    Collection Time: 09/30/17  1:37 PM   Result Value Ref Range    Magnesium 1.8 1.6 - 2.4 mg/dL   TROPONIN I    Collection Time: 09/30/17  1:37 PM   Result Value Ref Range    Troponin-I, Qt. <0.04 <0.05 ng/mL   EKG, 12 LEAD, INITIAL    Collection Time: 09/30/17  1:44 PM   Result Value Ref Range    Ventricular Rate 88 BPM    Atrial Rate 88 BPM    P-R Interval 160 ms    QRS Duration 110 ms    Q-T Interval 382 ms    QTC Calculation (Bezet) 462 ms    Calculated P Axis 51 degrees    Calculated R Axis -42 degrees    Calculated T Axis -11 degrees    Diagnosis       Normal sinus rhythm  Left axis deviation  Incomplete right bundle branch block  Moderate voltage criteria for LVH, may be normal variant  T wave abnormality, consider anterior ischemia  Abnormal ECG  When compared with ECG of 19-NOV-2016 18:29,  No significant change was found     URINALYSIS W/ RFLX MICROSCOPIC    Collection Time: 09/30/17  3:10 PM   Result Value Ref Range    Color YELLOW/STRAW      Appearance CLOUDY (A) CLEAR      Specific gravity 1.013 1.003 - 1.030      pH (UA) 7.5 5.0 - 8.0      Protein 30 (A) NEG mg/dL    Glucose NEGATIVE  NEG mg/dL    Ketone 15 (A) NEG mg/dL    Bilirubin NEGATIVE  NEG      Blood TRACE (A) NEG      Urobilinogen 1.0 0.2 - 1.0 EU/dL    Nitrites NEGATIVE  NEG      Leukocyte Esterase LARGE (A) NEG      WBC >100 (H) 0 - 4 /hpf    RBC 0-5 0 - 5 /hpf    Epithelial cells FEW FEW /lpf    Bacteria 2+ (A) NEG /hpf    Hyaline cast 2-5 0 - 5 /lpf       IMAGING RESULTS:    Xr Pelv Ap Only    Result Date: 9/30/2017  EXAM:  XR PELV AP ONLY, XR FEMUR LT 2 V, XR FEMUR RT 2 VS INDICATION: Left hip pain and pelvic after ground-level fall today. 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 COMPARISON: None. FINDINGS: An AP view of the pelvis demonstrates no displaced fracture. There is a lucency across the left femoral neck.  2 views of the right femur demonstrate no fracture or other osseous, articular or soft tissue abnormality. A right total hip replacement is in place without evidence for loosening. Chondrocalcinosis of the knee is noted. 2  views of the left femur demonstrate no fracture or other osseous, articular or soft tissue abnormality. Chondrocalcinosis of the knee and hip is noted. The finding on the pelvic radiograph is not confirmed on targeted images of the left hip. IMPRESSION: Osteopenic pelvis and hips. No fracture seen. Xr Shoulder Rt Ap/lat Min 2 V    Result Date: 9/30/2017  EXAM:  XR SHOULDER RT AP/LAT MIN 2 V INDICATION: Right shoulder pain after ground-level fall today. Visible bruise. COMPARISON: None. FINDINGS: Three views of the right shoulder demonstrate no fracture, dislocation or other acute abnormality. There is diffuse osteopenia. Chondrocalcinosis of the acromioclavicular joint is noted. IMPRESSION: Osteopenic right shoulder     Xr Femur Lt 2 V    Result Date: 9/30/2017  EXAM:  XR PELV AP ONLY, XR FEMUR LT 2 V, XR FEMUR RT 2 VS INDICATION: Left hip pain and pelvic after ground-level fall today. 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 COMPARISON: None. FINDINGS: An AP view of the pelvis demonstrates no displaced fracture. There is a lucency across the left femoral neck. 2 views of the right femur demonstrate no fracture or other osseous, articular or soft tissue abnormality. A right total hip replacement is in place without evidence for loosening. Chondrocalcinosis of the knee is noted. 2  views of the left femur demonstrate no fracture or other osseous, articular or soft tissue abnormality. Chondrocalcinosis of the knee and hip is noted. The finding on the pelvic radiograph is not confirmed on targeted images of the left hip. IMPRESSION: Osteopenic pelvis and hips. No fracture seen.      Xr Femur Rt 2 Vs    Result Date: 9/30/2017  EXAM:  XR PELV AP ONLY, XR FEMUR LT 2 V, XR FEMUR RT 2 VS INDICATION: Left hip pain and pelvic after ground-level fall today. 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 COMPARISON: None. FINDINGS: An AP view of the pelvis demonstrates no displaced fracture. There is a lucency across the left femoral neck. 2 views of the right femur demonstrate no fracture or other osseous, articular or soft tissue abnormality. A right total hip replacement is in place without evidence for loosening. Chondrocalcinosis of the knee is noted. 2  views of the left femur demonstrate no fracture or other osseous, articular or soft tissue abnormality. Chondrocalcinosis of the knee and hip is noted. The finding on the pelvic radiograph is not confirmed on targeted images of the left hip. IMPRESSION: Osteopenic pelvis and hips. No fracture seen. Ct Head Wo Cont    Result Date: 9/30/2017  EXAM:  CT HEAD WO CONT INDICATION:   Neck pain after unwitnessed ground-level fall. COMPARISON: None. CONTRAST:  None. TECHNIQUE: Unenhanced CT of the head was performed using 5 mm images. Brain and bone windows were generated. CT dose reduction was achieved through use of a standardized protocol tailored for this examination and automatic exposure control for dose modulation. FINDINGS: The ventricles and sulci are normal in size, shape and configuration and midline. There is moderate periventricular white matter hypodensity. . There is no intracranial hemorrhage, extra-axial collection, mass, mass effect or midline shift. The basilar cisterns are open. No acute infarct is identified. The bone windows demonstrate no abnormalities. The visualized portions of the paranasal sinuses and mastoid air cells are clear. IMPRESSION: No acute intracranial process identified. Specifically, no intracranial bleed. Underlying white matter disease.      Ct Spine Cerv Wo Cont    Result Date: 9/30/2017  EXAM:  CT CERVICAL SPINE WITHOUT CONTRAST INDICATION: Neck pain after an unwitnessed ground-level fall at the nursing home. COMPARISON: 1/19/2015 CONTRAST:  None. TECHNIQUE: Multislice helical CT of the cervical spine was performed without intravenous contrast administration. Sagittal and coronal reconstructions were generated. CT dose reduction was achieved through use of a standardized protocol tailored for this examination and automatic exposure control for dose modulation. FINDINGS: There is unchanged 2.7 mm anterolisthesis of C6 relative to C7, 1.7 mm anterolisthesis of C4 relative to C5, and 2 mm retrolisthesis of C2 relative to C3. There is no acute fracture. The odontoid process is intact. The craniocervical junction is within normal limits. The prevertebral soft tissues are within normal limits. C1-2, there is chondrocalcinosis of the C1-2 articulation with an erosive change at the base of the dens but no fracture. Severe end-stage left C1-2 arthropathy is noted on coronal image 17. C2-C3:  There is no spinal canal or neural foraminal stenosis. No central canal measures 12 mm anterior to posterior. C3-C4:  There is a disc osteophyte complex resulting in mild bilateral neural foraminal stenosis. The right C3-4 facet is fused. There is no central stenosis. C4-C5:  There is a disc osteophyte complex which results in mild bilateral neural foraminal stenosis (image 39) C5-C6:  There is a disc osteophyte complex, eccentric to the right. This results in moderate right-sided and no significant left neural foraminal stenosis. (Axial image 44). C6-C7:  There is a disc osteophyte complex eccentric which results in mild bilateral neural foraminal stenosis. The central canal measures 13.8 mm anterior-posterior. (Image 46). C7-T1:  There is a disc osteophyte complex eccentric to the left. This results in moderate severe left neural foraminal stenosis. The central canal measures 13.8 mm anterior-posterior. (Image 50). A stable erosive disc process is noted.      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 Severe left lateral mass arthropathy Stable subluxations as described Stable erosive disc process resulting in a deformity of the superior endplate of the T1 vertebral body. Moderate right C5-6 neural foraminal stenosis. Ct Pelv Wo Cont    Result Date: 9/30/2017  EXAM:  CT PELV WO CONT INDICATION:  fall, hip pain COMPARISON: 2/23/2017. CONTRAST:  None. TECHNIQUE: Multislice helical CT was performed from the iliac crest to the symphysis pubis without intravenous contrast administration Oral contrast was not administered. Coronal and sagittal reformations were generated. CT dose reduction was achieved through use of a standardized protocol tailored for this examination and automatic exposure control for dose modulation. Three-dimensional reconstructions were created the in the laboratory. FINDING: The visualized bowel within the pelvis is normal. The uterus is surgically absent. There is no pelvic mass or adenopathy. There is no pelvic ascites or fluid collection. There is chondrocalcinosis of the symphysis. A right total hip replacement is in place. Bilateral sacroiliac vacuum phenomena are seen. Asymmetric facet arthropathy is noted on the right at L4-5 and L5-S1, resulting in severe right L5-S1 and moderate right L4-5 neural foraminal stenosis. A vertically oriented fracture is noted through the right sacrum (coronal image 80 and axial image 29). This does not appear to disrupt any of the sacral arcuate foramen. There is disc space narrowing with vacuum phenomena at L4-5 and L5-S1. IMPRESSION: Osteopenia. Acute vertically oriented right sacral fracture.  Severe right L5-S1 neural foraminal stenosis       MEDICATIONS GIVEN:  Medications   morphine injection 2 mg (0 mg IntraVENous Held 9/30/17 1357)   ondansetron (ZOFRAN) injection 4 mg (0 mg IntraVENous Held 9/30/17 1357)   sodium chloride 0.9 % bolus infusion 500 mL (0 mL IntraVENous IV Completed 9/30/17 1824)   levoFLOXacin (LEVAQUIN) tablet 500 mg (500 mg Oral Given 9/30/17 1824)   acetaminophen (TYLENOL) tablet 650 mg (650 mg Oral Given 9/30/17 1824)       IMPRESSION:  1. Fall, initial encounter    2. Head injury, initial encounter    3. Urinary tract infection without hematuria, site unspecified    4. Closed fracture of sacrum, unspecified portion of sacrum, initial encounter ([facility] [address]    5. Contusion of right shoulder, initial encounter        PLAN:  1. Current Discharge Medication List      START taking these medications    Details   levoFLOXacin (LEVAQUIN) 500 mg tablet Take 1 Tab by mouth daily. Qty: 7 Tab, Refills: 0      acetaminophen (TYLENOL) 325 mg tablet Take 2 Tabs by mouth every six (6) hours as needed for Pain. Qty: 20 Tab, Refills: 0         CONTINUE these medications which have NOT CHANGED    Details   fluticasone-salmeterol (ADVAIR DISKUS) 100-50 mcg/dose diskus inhaler Take 1 Puff by inhalation every twelve (12) hours. clopidogrel (PLAVIX) 75 mg tablet Take 75 mg by mouth. furosemide (LASIX) 40 mg tablet Take 40 mg by mouth daily. atenolol (TENORMIN) 25 mg tablet Take 25 mg by mouth daily. amLODIPine (NORVASC) 5 mg tablet Take 1 tablet by mouth daily. Qty: 30 tablet, Refills: 0      calcium carbonate (OS-MAGDA) 500 mg calcium (1,250 mg) tablet Take 1 tablet by mouth daily. docusate sodium (COLACE) 100 mg capsule Take 100 mg by mouth two (2) times a day. potassium chloride (KLOR-CON M20) 20 mEq tablet Take 20 mEq by mouth daily. Vit C-Vit E-Copper-ZnOx-Lutein (PRESERVISION LUTEIN) 226-200-5-0.8 mg-unit-mg-mg cap Take 1 capsule by mouth two (2) times a day. levothyroxine (SYNTHROID) 75 mcg tablet Take 75 mcg by mouth Daily (before breakfast). solifenacin (VESICARE) 5 mg tablet Take 5 mg by mouth daily. aspirin 81 mg chewable tablet Take 81 mg by mouth daily.       multivitamins-minerals-lutein (CEROVITE SENIOR) tab tablet Take 1 tablet by mouth daily.      loratadine (CLARITIN) 10 mg tablet Take 10 mg by mouth daily. ranitidine hcl 150 mg capsule Take 150 mg by mouth two (2) times a day. magnesium hydroxide (PADILLA MILK OF MAGNESIA) 400 mg/5 mL suspension Take 30 mL by mouth daily as needed for Constipation. albuterol (PROVENTIL HFA, VENTOLIN HFA, PROAIR HFA) 90 mcg/actuation inhaler Take 1-2 puffs by inhalation every four (4) hours as needed for Wheezing. Qty: 1 Inhaler, Refills: 0           2. Follow-up Information     Follow up With Details Comments [address] Jose DO Schedule an appointment as soon as possible for a visit 2-4 days for recheck Cicero Volodymyr Arriaza MD Schedule an appointment as soon as possible for a visit 2-4 days for recheck . [address] [phone]          Return to ED if worse     6:39 PM  Pt has been reexamined. Pt has no new complaints, changes or physical findings. Care plan outlined and precautions discussed. All available results were reviewed with pt. All medications were reviewed with pt. All of pt's questions and concerns were addressed. Pt agrees to F/U as instructed and agrees to return to ED upon further deterioration. Pt is ready to go home.   RAJ Braun

## 2017-10-02 LAB
ATRIAL RATE: 88 BPM
BACTERIA SPEC CULT: NORMAL
CALCULATED P AXIS, ECG09: 51 DEGREES
CALCULATED R AXIS, ECG10: -42 DEGREES
CALCULATED T AXIS, ECG11: -11 DEGREES
CC UR VC: NORMAL
DIAGNOSIS, 93000: NORMAL
P-R INTERVAL, ECG05: 160 MS
Q-T INTERVAL, ECG07: 382 MS
QRS DURATION, ECG06: 110 MS
QTC CALCULATION (BEZET), ECG08: 462 MS
SERVICE CMNT-IMP: NORMAL
VENTRICULAR RATE, ECG03: 88 BPM